# Patient Record
Sex: MALE | Race: ASIAN | URBAN - METROPOLITAN AREA
[De-identification: names, ages, dates, MRNs, and addresses within clinical notes are randomized per-mention and may not be internally consistent; named-entity substitution may affect disease eponyms.]

---

## 2023-05-12 ENCOUNTER — CLAIMS CREATED FROM THE CLAIM WINDOW (OUTPATIENT)
Dept: URBAN - METROPOLITAN AREA MEDICAL CENTER 10 | Facility: MEDICAL CENTER | Age: 63
End: 2023-05-12

## 2023-05-12 ENCOUNTER — CLAIMS CREATED FROM THE CLAIM WINDOW (OUTPATIENT)
Dept: URBAN - METROPOLITAN AREA MEDICAL CENTER 10 | Facility: MEDICAL CENTER | Age: 63
End: 2023-05-12
Payer: COMMERCIAL

## 2023-05-12 ENCOUNTER — OFFICE VISIT (OUTPATIENT)
Dept: URBAN - METROPOLITAN AREA CLINIC 78 | Facility: CLINIC | Age: 63
End: 2023-05-12

## 2023-05-12 DIAGNOSIS — B96.81 BACTERIAL INFECTION DUE TO H. PYLORI: ICD-10-CM

## 2023-05-12 DIAGNOSIS — R93.3 ABN FINDINGS-GI TRACT: ICD-10-CM

## 2023-05-12 DIAGNOSIS — R11.2 ACUTE NAUSEA WITH NONBILIOUS VOMITING: ICD-10-CM

## 2023-05-12 DIAGNOSIS — K21.01 GASTRO-ESOPHAGEAL REFLUX DISEASE WITH ESOPHAGITIS, WITH BLEEDING: ICD-10-CM

## 2023-05-12 DIAGNOSIS — K29.60 ADENOPAPILLOMATOSIS GASTRICA: ICD-10-CM

## 2023-05-12 PROCEDURE — 99223 1ST HOSP IP/OBS HIGH 75: CPT | Performed by: INTERNAL MEDICINE

## 2023-05-12 PROCEDURE — 43239 EGD BIOPSY SINGLE/MULTIPLE: CPT | Performed by: INTERNAL MEDICINE

## 2023-05-15 ENCOUNTER — OUT OF OFFICE VISIT (OUTPATIENT)
Dept: URBAN - METROPOLITAN AREA MEDICAL CENTER 10 | Facility: MEDICAL CENTER | Age: 63
End: 2023-05-15

## 2023-05-15 ENCOUNTER — CLAIMS CREATED FROM THE CLAIM WINDOW (OUTPATIENT)
Dept: URBAN - METROPOLITAN AREA MEDICAL CENTER 10 | Facility: MEDICAL CENTER | Age: 63
End: 2023-05-15
Payer: COMMERCIAL

## 2023-05-15 DIAGNOSIS — R93.3 ABN FINDINGS-GI TRACT: ICD-10-CM

## 2023-05-15 DIAGNOSIS — R11.2 ACUTE NAUSEA WITH NONBILIOUS VOMITING: ICD-10-CM

## 2023-05-15 DIAGNOSIS — K59.09 CHANGE IN BOWEL MOVEMENTS INTERMITTENT CONSTIPATION. URGENCY IN THE MORNING.: ICD-10-CM

## 2023-05-15 PROCEDURE — 99232 SBSQ HOSP IP/OBS MODERATE 35: CPT | Performed by: INTERNAL MEDICINE

## 2023-05-20 ENCOUNTER — OUT OF OFFICE VISIT (OUTPATIENT)
Dept: URBAN - METROPOLITAN AREA MEDICAL CENTER 10 | Facility: MEDICAL CENTER | Age: 63
End: 2023-05-20
Payer: COMMERCIAL

## 2023-05-20 DIAGNOSIS — K59.09 CHANGE IN BOWEL MOVEMENTS INTERMITTENT CONSTIPATION. URGENCY IN THE MORNING.: ICD-10-CM

## 2023-05-20 DIAGNOSIS — R11.2 ACUTE NAUSEA WITH NONBILIOUS VOMITING: ICD-10-CM

## 2023-05-20 DIAGNOSIS — K20.80 ESOPHAGITIS DISSECANS SUPERFICIALIS: ICD-10-CM

## 2023-05-20 PROCEDURE — 99232 SBSQ HOSP IP/OBS MODERATE 35: CPT | Performed by: INTERNAL MEDICINE

## 2023-05-21 ENCOUNTER — OUT OF OFFICE VISIT (OUTPATIENT)
Dept: URBAN - METROPOLITAN AREA MEDICAL CENTER 10 | Facility: MEDICAL CENTER | Age: 63
End: 2023-05-21
Payer: COMMERCIAL

## 2023-05-21 DIAGNOSIS — R11.2 ACUTE NAUSEA WITH NONBILIOUS VOMITING: ICD-10-CM

## 2023-05-21 DIAGNOSIS — B96.81 BACTERIAL INFECTION DUE TO H. PYLORI: ICD-10-CM

## 2023-05-21 DIAGNOSIS — K29.60 ADENOPAPILLOMATOSIS GASTRICA: ICD-10-CM

## 2023-05-21 DIAGNOSIS — K59.09 CHANGE IN BOWEL MOVEMENTS INTERMITTENT CONSTIPATION. URGENCY IN THE MORNING.: ICD-10-CM

## 2023-05-21 PROCEDURE — 99232 SBSQ HOSP IP/OBS MODERATE 35: CPT | Performed by: INTERNAL MEDICINE

## 2023-05-22 ENCOUNTER — OUT OF OFFICE VISIT (OUTPATIENT)
Dept: URBAN - METROPOLITAN AREA MEDICAL CENTER 10 | Facility: MEDICAL CENTER | Age: 63
End: 2023-05-22
Payer: COMMERCIAL

## 2023-05-22 DIAGNOSIS — R11.2 ACUTE NAUSEA WITH NONBILIOUS VOMITING: ICD-10-CM

## 2023-05-22 DIAGNOSIS — K59.09 CHANGE IN BOWEL MOVEMENTS INTERMITTENT CONSTIPATION. URGENCY IN THE MORNING.: ICD-10-CM

## 2023-05-22 DIAGNOSIS — R93.3 ABN FINDINGS-GI TRACT: ICD-10-CM

## 2023-05-22 DIAGNOSIS — R63.0 ALMOST NO APPETITE: ICD-10-CM

## 2023-05-22 PROCEDURE — 99232 SBSQ HOSP IP/OBS MODERATE 35: CPT | Performed by: INTERNAL MEDICINE

## 2023-05-22 PROCEDURE — 99231 SBSQ HOSP IP/OBS SF/LOW 25: CPT | Performed by: INTERNAL MEDICINE

## 2023-06-05 ENCOUNTER — CLAIMS CREATED FROM THE CLAIM WINDOW (OUTPATIENT)
Dept: URBAN - METROPOLITAN AREA MEDICAL CENTER 10 | Facility: MEDICAL CENTER | Age: 63
End: 2023-06-05
Payer: COMMERCIAL

## 2023-06-05 ENCOUNTER — CLAIMS CREATED FROM THE CLAIM WINDOW (OUTPATIENT)
Dept: URBAN - METROPOLITAN AREA MEDICAL CENTER 10 | Facility: MEDICAL CENTER | Age: 63
End: 2023-06-05

## 2023-06-05 DIAGNOSIS — K59.09 CHANGE IN BOWEL MOVEMENTS INTERMITTENT CONSTIPATION. URGENCY IN THE MORNING.: ICD-10-CM

## 2023-06-05 DIAGNOSIS — R63.0 ALMOST NO APPETITE: ICD-10-CM

## 2023-06-05 DIAGNOSIS — R11.0 AM NAUSEA: ICD-10-CM

## 2023-06-05 DIAGNOSIS — R93.2 ABN FIND-BILIARY TRACT: ICD-10-CM

## 2023-06-05 PROCEDURE — 99223 1ST HOSP IP/OBS HIGH 75: CPT | Performed by: INTERNAL MEDICINE

## 2023-06-05 PROCEDURE — 99232 SBSQ HOSP IP/OBS MODERATE 35: CPT | Performed by: INTERNAL MEDICINE

## 2023-06-05 PROCEDURE — G8427 DOCREV CUR MEDS BY ELIG CLIN: HCPCS | Performed by: INTERNAL MEDICINE

## 2023-06-06 ENCOUNTER — OFFICE VISIT (OUTPATIENT)
Dept: URBAN - METROPOLITAN AREA CLINIC 78 | Facility: CLINIC | Age: 63
End: 2023-06-06

## 2023-06-08 ENCOUNTER — OUT OF OFFICE VISIT (OUTPATIENT)
Dept: URBAN - METROPOLITAN AREA MEDICAL CENTER 10 | Facility: MEDICAL CENTER | Age: 63
End: 2023-06-08

## 2023-06-08 ENCOUNTER — CLAIMS CREATED FROM THE CLAIM WINDOW (OUTPATIENT)
Dept: URBAN - METROPOLITAN AREA MEDICAL CENTER 10 | Facility: MEDICAL CENTER | Age: 63
End: 2023-06-08
Payer: COMMERCIAL

## 2023-06-08 DIAGNOSIS — R11.2 ACUTE NAUSEA WITH NONBILIOUS VOMITING: ICD-10-CM

## 2023-06-08 DIAGNOSIS — K21.9 ACID REFLUX: ICD-10-CM

## 2023-06-08 DIAGNOSIS — R93.2 ABN FIND-BILIARY TRACT: ICD-10-CM

## 2023-06-08 DIAGNOSIS — K31.84 DECREASED MOTILITY OF STOMACH: ICD-10-CM

## 2023-06-08 PROCEDURE — 99232 SBSQ HOSP IP/OBS MODERATE 35: CPT | Performed by: INTERNAL MEDICINE

## 2023-06-18 ENCOUNTER — CLAIMS CREATED FROM THE CLAIM WINDOW (OUTPATIENT)
Dept: URBAN - METROPOLITAN AREA MEDICAL CENTER 10 | Facility: MEDICAL CENTER | Age: 63
End: 2023-06-18

## 2023-06-18 ENCOUNTER — CLAIMS CREATED FROM THE CLAIM WINDOW (OUTPATIENT)
Dept: URBAN - METROPOLITAN AREA MEDICAL CENTER 10 | Facility: MEDICAL CENTER | Age: 63
End: 2023-06-18
Payer: COMMERCIAL

## 2023-06-18 DIAGNOSIS — K20.80 ESOPHAGITIS DISSECANS SUPERFICIALIS: ICD-10-CM

## 2023-06-18 DIAGNOSIS — K25.9 ANTRAL ULCER: ICD-10-CM

## 2023-06-18 DIAGNOSIS — K92.0 BLOODY EMESIS: ICD-10-CM

## 2023-06-18 PROCEDURE — 43235 EGD DIAGNOSTIC BRUSH WASH: CPT | Performed by: STUDENT IN AN ORGANIZED HEALTH CARE EDUCATION/TRAINING PROGRAM

## 2023-06-19 ENCOUNTER — OUT OF OFFICE VISIT (OUTPATIENT)
Dept: URBAN - METROPOLITAN AREA MEDICAL CENTER 10 | Facility: MEDICAL CENTER | Age: 63
End: 2023-06-19
Payer: COMMERCIAL

## 2023-06-19 DIAGNOSIS — K21.00 ALKALINE REFLUX ESOPHAGITIS: ICD-10-CM

## 2023-06-19 DIAGNOSIS — K31.84 GASTROPARESIS: ICD-10-CM

## 2023-06-19 DIAGNOSIS — A04.8 BACTERIAL INFECTION DUE TO H. PYLORI: ICD-10-CM

## 2023-06-19 PROCEDURE — 99232 SBSQ HOSP IP/OBS MODERATE 35: CPT | Performed by: INTERNAL MEDICINE

## 2023-06-23 ENCOUNTER — OUT OF OFFICE VISIT (OUTPATIENT)
Dept: URBAN - METROPOLITAN AREA MEDICAL CENTER 10 | Facility: MEDICAL CENTER | Age: 63
End: 2023-06-23
Payer: COMMERCIAL

## 2023-06-23 DIAGNOSIS — K21.00 ALKALINE REFLUX ESOPHAGITIS: ICD-10-CM

## 2023-06-23 DIAGNOSIS — K31.84 GASTROPARESIS: ICD-10-CM

## 2023-06-23 DIAGNOSIS — A04.8 BACTERIAL INFECTION DUE TO H. PYLORI: ICD-10-CM

## 2023-06-23 PROCEDURE — 99232 SBSQ HOSP IP/OBS MODERATE 35: CPT | Performed by: INTERNAL MEDICINE

## 2023-06-26 ENCOUNTER — OUT OF OFFICE VISIT (OUTPATIENT)
Dept: URBAN - METROPOLITAN AREA MEDICAL CENTER 10 | Facility: MEDICAL CENTER | Age: 63
End: 2023-06-26
Payer: COMMERCIAL

## 2023-06-26 DIAGNOSIS — A04.8 BACTERIAL INFECTION DUE TO H. PYLORI: ICD-10-CM

## 2023-06-26 DIAGNOSIS — E46 CALORIC MALNUTRITION: ICD-10-CM

## 2023-06-26 DIAGNOSIS — K31.84 DECREASED MOTILITY OF STOMACH: ICD-10-CM

## 2023-06-26 DIAGNOSIS — R19.7 ACUTE DIARRHEA: ICD-10-CM

## 2023-06-26 PROCEDURE — 99232 SBSQ HOSP IP/OBS MODERATE 35: CPT | Performed by: INTERNAL MEDICINE

## 2023-07-06 ENCOUNTER — TELEPHONE ENCOUNTER (OUTPATIENT)
Dept: URBAN - METROPOLITAN AREA CLINIC 78 | Facility: CLINIC | Age: 63
End: 2023-07-06

## 2023-07-07 ENCOUNTER — TELEPHONE ENCOUNTER (OUTPATIENT)
Dept: URBAN - METROPOLITAN AREA CLINIC 78 | Facility: CLINIC | Age: 63
End: 2023-07-07

## 2023-07-07 PROBLEM — 13200003: Status: ACTIVE | Noted: 2023-07-07

## 2023-07-07 RX ORDER — CLARITHROMYCIN 500 MG/1
1 TABLET TABLET, FILM COATED ORAL
Qty: 28 TABLET | Refills: 0 | OUTPATIENT
Start: 2023-07-07 | End: 2023-07-21

## 2023-07-07 RX ORDER — AMOXICILLIN 500 MG/1
2 CAPSULES CAPSULE ORAL
Qty: 56 CAPSULE | Refills: 0 | OUTPATIENT
Start: 2023-07-07 | End: 2023-07-21

## 2023-07-07 RX ORDER — OMEPRAZOLE 20 MG/1
1 CAPSULE 30 MINUTES BEFORE MORNING MEAL AND 30 MINUTES BEFORE DINNER CAPSULE, DELAYED RELEASE ORAL TWICE DAILY
Qty: 28 | Refills: 0 | OUTPATIENT
Start: 2023-07-07

## 2023-07-07 RX ORDER — PANTOPRAZOLE SODIUM 20 MG/1
1 TABLET TABLET, DELAYED RELEASE ORAL ONCE A DAY
Qty: 30 | OUTPATIENT
Start: 2023-07-07

## 2023-07-10 ENCOUNTER — OUT OF OFFICE VISIT (OUTPATIENT)
Dept: URBAN - METROPOLITAN AREA MEDICAL CENTER 10 | Facility: MEDICAL CENTER | Age: 63
End: 2023-07-10
Payer: COMMERCIAL

## 2023-07-10 DIAGNOSIS — K31.84 DECREASED MOTILITY OF STOMACH: ICD-10-CM

## 2023-07-10 DIAGNOSIS — R19.7 ACUTE DIARRHEA: ICD-10-CM

## 2023-07-10 DIAGNOSIS — R14.0 ABDOMINAL BLOATING: ICD-10-CM

## 2023-07-10 DIAGNOSIS — R11.0 AM NAUSEA: ICD-10-CM

## 2023-07-10 PROCEDURE — G8427 DOCREV CUR MEDS BY ELIG CLIN: HCPCS | Performed by: INTERNAL MEDICINE

## 2023-07-10 PROCEDURE — 99223 1ST HOSP IP/OBS HIGH 75: CPT | Performed by: INTERNAL MEDICINE

## 2023-07-10 PROCEDURE — 99232 SBSQ HOSP IP/OBS MODERATE 35: CPT | Performed by: INTERNAL MEDICINE

## 2023-07-28 ENCOUNTER — TELEPHONE ENCOUNTER (OUTPATIENT)
Dept: URBAN - METROPOLITAN AREA CLINIC 78 | Facility: CLINIC | Age: 63
End: 2023-07-28

## 2023-08-08 ENCOUNTER — TELEPHONE ENCOUNTER (OUTPATIENT)
Dept: URBAN - METROPOLITAN AREA CLINIC 78 | Facility: CLINIC | Age: 63
End: 2023-08-08

## 2023-08-08 ENCOUNTER — OFFICE VISIT (OUTPATIENT)
Dept: URBAN - METROPOLITAN AREA MEDICAL CENTER 10 | Facility: MEDICAL CENTER | Age: 63
End: 2023-08-08
Payer: COMMERCIAL

## 2023-08-08 DIAGNOSIS — K29.60 ADENOPAPILLOMATOSIS GASTRICA: ICD-10-CM

## 2023-08-08 DIAGNOSIS — K20.80 ESOPHAGITIS DISSECANS SUPERFICIALIS: ICD-10-CM

## 2023-08-08 DIAGNOSIS — K25.9 ANTRAL ULCER: ICD-10-CM

## 2023-08-08 PROCEDURE — 43239 EGD BIOPSY SINGLE/MULTIPLE: CPT | Performed by: INTERNAL MEDICINE

## 2023-08-08 RX ORDER — PANTOPRAZOLE SODIUM 20 MG/1
1 TABLET TABLET, DELAYED RELEASE ORAL ONCE A DAY
Qty: 30 | Status: ACTIVE | COMMUNITY
Start: 2023-07-07

## 2023-08-08 RX ORDER — SUCRALFATE 1 G/1
1 TABLET ON AN EMPTY STOMACH TABLET ORAL
Qty: 120 | Refills: 3 | OUTPATIENT
Start: 2023-08-08 | End: 2023-09-07

## 2023-08-08 RX ORDER — OMEPRAZOLE 20 MG/1
1 CAPSULE 30 MINUTES BEFORE MORNING MEAL AND 30 MINUTES BEFORE DINNER CAPSULE, DELAYED RELEASE ORAL TWICE DAILY
Qty: 28 | Refills: 0 | Status: ACTIVE | COMMUNITY
Start: 2023-07-07

## 2023-08-11 ENCOUNTER — OFFICE VISIT (OUTPATIENT)
Dept: URBAN - METROPOLITAN AREA SURGERY CENTER 15 | Facility: SURGERY CENTER | Age: 63
End: 2023-08-11

## 2023-08-28 ENCOUNTER — OFFICE VISIT (OUTPATIENT)
Dept: URBAN - METROPOLITAN AREA CLINIC 78 | Facility: CLINIC | Age: 63
End: 2023-08-28
Payer: COMMERCIAL

## 2023-08-28 VITALS
BODY MASS INDEX: 15.85 KG/M2 | DIASTOLIC BLOOD PRESSURE: 69 MMHG | TEMPERATURE: 98.2 F | RESPIRATION RATE: 15 BRPM | HEIGHT: 69 IN | SYSTOLIC BLOOD PRESSURE: 93 MMHG | HEART RATE: 108 BPM | WEIGHT: 107 LBS

## 2023-08-28 DIAGNOSIS — R11.2 NAUSEA & VOMITING: ICD-10-CM

## 2023-08-28 DIAGNOSIS — K20.90 ESOPHAGITIS: ICD-10-CM

## 2023-08-28 DIAGNOSIS — K31.84 GASTROPARESIS: ICD-10-CM

## 2023-08-28 DIAGNOSIS — A04.8 H. PYLORI INFECTION: ICD-10-CM

## 2023-08-28 PROCEDURE — 99215 OFFICE O/P EST HI 40 MIN: CPT | Performed by: INTERNAL MEDICINE

## 2023-08-28 RX ORDER — PANTOPRAZOLE SODIUM 20 MG/1
1 TABLET TABLET, DELAYED RELEASE ORAL ONCE A DAY
Qty: 30 | Status: ACTIVE | COMMUNITY
Start: 2023-07-07

## 2023-08-28 RX ORDER — SUCRALFATE 1 G/1
1 TABLET ON AN EMPTY STOMACH TABLET ORAL
Qty: 120 | Refills: 3
Start: 2023-08-08 | End: 2023-12-26

## 2023-08-28 RX ORDER — SUCRALFATE 1 G/1
1 TABLET ON AN EMPTY STOMACH TABLET ORAL
Qty: 120 | Refills: 3 | Status: ON HOLD | COMMUNITY
Start: 2023-08-08 | End: 2023-09-07

## 2023-08-28 RX ORDER — OMEPRAZOLE 20 MG/1
1 CAPSULE 30 MINUTES BEFORE MORNING MEAL AND 30 MINUTES BEFORE DINNER CAPSULE, DELAYED RELEASE ORAL TWICE DAILY
Qty: 28 | Refills: 0 | Status: ACTIVE | COMMUNITY
Start: 2023-07-07

## 2023-08-28 RX ORDER — ESOMEPRAZOLE MAGNESIUM 40 MG/1
1 CAPSULE 30 MINUTES BEFORE BREAKFAST AND 30 MINUTES BEFORE DINNER CAPSULE, DELAYED RELEASE ORAL TWICE A DAY
Qty: 60 | Refills: 3 | OUTPATIENT
Start: 2023-08-28

## 2023-08-28 RX ORDER — METOCLOPRAMIDE 10 MG/1
1 TABLET BEFORE MEALS TABLET ORAL
Qty: 300 | Refills: 1

## 2023-08-28 RX ORDER — PANTOPRAZOLE SODIUM 40 MG/1
1 TABLET- 30 MINS BEFORE AND 30 MINS BEFORE DINNER TABLET, DELAYED RELEASE ORAL TWICE A DAY
Qty: 60 | Refills: 2 | OUTPATIENT
Start: 2023-08-28

## 2023-08-28 NOTE — PHYSICAL EXAM GASTROINTESTINAL
Abdomen , soft, nontender, nondistended , no guarding or rigidity , no masses palpable , normal bowel sounds , G-J tube in place without erythema and only minimal discharge Liver and Spleen , no hepatomegaly present , no hepatosplenomegaly , liver nontender , spleen not palpable  , Abdomen , soft, nontender, nondistended , no guarding or rigidity , no masses palpable , normal bowel sounds , Liver and Spleen , no hepatomegaly present , no hepatosplenomegaly , liver nontender , spleen not palpable

## 2023-08-28 NOTE — HPI-TODAY'S VISIT:
I have met the patient on repeated occasions at [facility] for recurrent admissions due to underlying diabetic gastroparesis and intractable nausea and vomiting complicated by severe malnutrition. The patient is a 62-year-old   gentleman well-known to our service who was diagnosed with diabetic gastroparesis status post GJ tube placement on 614 (G-tube for venting, G-tube for nutrition), also noted to have H. pylori infection (status posttreatment, eradication has since been achieved) and peptic ulcer disease.  He has had intermittent episodes of diarrhea alternating with constipation.  He has macrocytic anemia and poorly controlled type 2 diabetes mellitus with glucose levels over 200-300 at times. He has required admissions for fevers and chills felt to be secondary to aspiration pneumonia, hypotension, metabolic acidosis and acute kidney injury.  He is being worked up with [facility] (Dr. Shields) for GPOEM, but the family tells me that they went to see some other doctor that also treats motility in the meantime.As a matter of fact, he had another EGD last week as they were seeking a second opinion.    He has been compliant with using PPI BID and Carafate QID He has also been using Imodium +/- probiotic to manage the diarrhea.  He has been vomiting, mainly bilious fluid.  They took him to the ED yesterday again. CT scan was apparently suggestive but not diagnostic of another pneumonia.   He denies much in the way of heartburn.   They are concerned about him having been on Reglan for the past 3 months.   Today I reviewed the results of his most recent EGD/path with us. H pylori was negative.   He continues to spit up constantly.   Family is worried about Protonix causing penumonia.  Summary of prior work-up: - CT A/P on 8/27/23: 1.  No bowel obstruction. Gastrojejunostomy tube in place. While no radiopaque line is seen along the catheter from the gastric balloon to the proximal duodenum, the catheter appears to be contiguous. If there is concern for catheter disruption, consider further evaluation with KUB following contrast injection through the tube. 2.  Mild groundglass and consolidative opacities in the right middle lobe and right lung base, concerning for pneumonia. - EGD by me on 8/8/23:  Normal upper third of esophagus and middle third of esophagus. LA Grade C erosive esophagitis with no bleeding. [place]-colored mucosa suspicious for short-segment King's esophagus. No gross lesions in the cardia and in the gastric fundus.  Nodular mucosa in the gastric body. Congestive gastropathy. A single mucosal papule (nodule) found in the stomach. A gastric tube with tip in the jejunum was found in the stomach. Normal examined duodenum. Biopsies were neg for BE, H pylori or GIM. + chronic inflammation in the esophagus. - MRI 6/8/23: 1. Unclear if artifact or true pancreatic parenchymal edema with trace surrounding ascites such as may be associated with interstitial edematous acute pancreatitis. In clinical setting of no reported abdominal pain findings favored to be artifactual but consider repeat lipase to clarify. 2. Mild central biliary radicle and CBD duct dilatation to 7 mm with demonstrated appropriate tapering of CBD towards the papilla. No demonstrated choledocholithiasis or gallbladder calculus. Gallbladder within expected limits. . Patchy marrow enhancement with heterogeneous signal predominating about the bony pelvis that is strictly nonspecific. 4. Prominent cachexia. Trace ascites versus artifact. No organized drainable collection.  - EGD on 6/18/23 by Dr. Gill: Jason class III gastric ulcer ~10cm in largest diameter and LA Grade C Esophagitis - GES on 6/12/23 unsuccessful because patient could not tolerate PO - CT with contrast in May 2023 showing patchy opacity within the dependent right lobe possible atelectasis vs PNA-mild wall thickening of distal esophagus and multiple prominent fluid filled small bowel loops without obstruction distended stomach with A/F level - GES on May 2023:  Abnormal study. Activity does not appear to empty from the stomach during the course of the study. 100% of activity remaining in the stomach at 90 minutes. -  EGD by me  on 5/13/23: Grade D esophagitis.  A large amount of food (residue) in the stomach. Erythematous mucosa in the antrum. Normal pylorus. Congested duodenal mucosa. Biopsied -CT showing borderline dilated CBD and mild prominence of  intrahepatic ducts however LFTS normal

## 2023-08-28 NOTE — PHYSICAL EXAM CONSTITUTIONAL:
well developed,cachectic , in no acute distress , ambulating without difficulty , normal communication ability  ,

## 2023-09-14 ENCOUNTER — OUT OF OFFICE VISIT (OUTPATIENT)
Dept: URBAN - METROPOLITAN AREA MEDICAL CENTER 10 | Facility: MEDICAL CENTER | Age: 63
End: 2023-09-14
Payer: COMMERCIAL

## 2023-09-14 DIAGNOSIS — R11.2 ACUTE NAUSEA WITH NONBILIOUS VOMITING: ICD-10-CM

## 2023-09-14 DIAGNOSIS — K59.09 CHANGE IN BOWEL MOVEMENTS INTERMITTENT CONSTIPATION. URGENCY IN THE MORNING.: ICD-10-CM

## 2023-09-14 DIAGNOSIS — R63.4 ABNORMAL INTENTIONAL WEIGHT LOSS: ICD-10-CM

## 2023-09-14 PROCEDURE — 99223 1ST HOSP IP/OBS HIGH 75: CPT | Performed by: INTERNAL MEDICINE

## 2023-09-14 PROCEDURE — 99233 SBSQ HOSP IP/OBS HIGH 50: CPT | Performed by: INTERNAL MEDICINE

## 2023-09-16 ENCOUNTER — OUT OF OFFICE VISIT (OUTPATIENT)
Dept: URBAN - METROPOLITAN AREA MEDICAL CENTER 10 | Facility: MEDICAL CENTER | Age: 63
End: 2023-09-16
Payer: COMMERCIAL

## 2023-09-16 DIAGNOSIS — K62.89 ACUTE PROCTITIS: ICD-10-CM

## 2023-09-16 DIAGNOSIS — K92.2 ACUTE GASTROINTESTINAL BLEEDING: ICD-10-CM

## 2023-09-16 PROCEDURE — 45334 SIGMOIDOSCOPY FOR BLEEDING: CPT | Performed by: INTERNAL MEDICINE

## 2023-09-17 ENCOUNTER — OUT OF OFFICE VISIT (OUTPATIENT)
Dept: URBAN - METROPOLITAN AREA MEDICAL CENTER 10 | Facility: MEDICAL CENTER | Age: 63
End: 2023-09-17
Payer: COMMERCIAL

## 2023-09-17 DIAGNOSIS — K62.89 ACUTE PROCTITIS: ICD-10-CM

## 2023-09-17 DIAGNOSIS — K62.5 ANAL BLEEDING: ICD-10-CM

## 2023-09-17 DIAGNOSIS — K59.09 CHANGE IN BOWEL MOVEMENTS INTERMITTENT CONSTIPATION. URGENCY IN THE MORNING.: ICD-10-CM

## 2023-09-17 PROCEDURE — 99233 SBSQ HOSP IP/OBS HIGH 50: CPT | Performed by: INTERNAL MEDICINE

## 2023-09-18 ENCOUNTER — OUT OF OFFICE VISIT (OUTPATIENT)
Dept: URBAN - METROPOLITAN AREA MEDICAL CENTER 10 | Facility: MEDICAL CENTER | Age: 63
End: 2023-09-18

## 2023-09-18 ENCOUNTER — CLAIMS CREATED FROM THE CLAIM WINDOW (OUTPATIENT)
Dept: URBAN - METROPOLITAN AREA MEDICAL CENTER 10 | Facility: MEDICAL CENTER | Age: 63
End: 2023-09-18
Payer: COMMERCIAL

## 2023-09-18 DIAGNOSIS — K62.6 ANAL ULCER: ICD-10-CM

## 2023-09-18 DIAGNOSIS — D64.89 ANEMIA DUE TO OTHER CAUSE: ICD-10-CM

## 2023-09-18 DIAGNOSIS — K62.5 ANAL BLEEDING: ICD-10-CM

## 2023-09-18 DIAGNOSIS — K59.09 CHANGE IN BOWEL MOVEMENTS INTERMITTENT CONSTIPATION. URGENCY IN THE MORNING.: ICD-10-CM

## 2023-09-18 PROCEDURE — 99232 SBSQ HOSP IP/OBS MODERATE 35: CPT | Performed by: INTERNAL MEDICINE

## 2023-09-19 ENCOUNTER — CLAIMS CREATED FROM THE CLAIM WINDOW (OUTPATIENT)
Dept: URBAN - METROPOLITAN AREA MEDICAL CENTER 10 | Facility: MEDICAL CENTER | Age: 63
End: 2023-09-19
Payer: COMMERCIAL

## 2023-09-19 ENCOUNTER — OUT OF OFFICE VISIT (OUTPATIENT)
Dept: URBAN - METROPOLITAN AREA MEDICAL CENTER 10 | Facility: MEDICAL CENTER | Age: 63
End: 2023-09-19

## 2023-09-19 DIAGNOSIS — D62 ABLA (ACUTE BLOOD LOSS ANEMIA): ICD-10-CM

## 2023-09-19 DIAGNOSIS — K62.5 ANAL BLEEDING: ICD-10-CM

## 2023-09-19 DIAGNOSIS — K59.09 CHANGE IN BOWEL MOVEMENTS INTERMITTENT CONSTIPATION. URGENCY IN THE MORNING.: ICD-10-CM

## 2023-09-19 PROCEDURE — 99232 SBSQ HOSP IP/OBS MODERATE 35: CPT | Performed by: INTERNAL MEDICINE

## 2023-09-24 ENCOUNTER — OUT OF OFFICE VISIT (OUTPATIENT)
Dept: URBAN - METROPOLITAN AREA MEDICAL CENTER 10 | Facility: MEDICAL CENTER | Age: 63
End: 2023-09-24

## 2023-09-24 ENCOUNTER — CLAIMS CREATED FROM THE CLAIM WINDOW (OUTPATIENT)
Dept: URBAN - METROPOLITAN AREA MEDICAL CENTER 10 | Facility: MEDICAL CENTER | Age: 63
End: 2023-09-24
Payer: COMMERCIAL

## 2023-09-24 DIAGNOSIS — K62.6 ANAL ULCER: ICD-10-CM

## 2023-09-24 DIAGNOSIS — K63.3 APHTHOUS ULCER OF COLON: ICD-10-CM

## 2023-09-24 PROCEDURE — 45331 SIGMOIDOSCOPY AND BIOPSY: CPT | Performed by: INTERNAL MEDICINE

## 2023-09-24 PROCEDURE — 45334 SIGMOIDOSCOPY FOR BLEEDING: CPT | Performed by: INTERNAL MEDICINE

## 2023-09-25 ENCOUNTER — OUT OF OFFICE VISIT (OUTPATIENT)
Dept: URBAN - METROPOLITAN AREA MEDICAL CENTER 10 | Facility: MEDICAL CENTER | Age: 63
End: 2023-09-25
Payer: COMMERCIAL

## 2023-09-25 DIAGNOSIS — K62.6 ANAL ULCER: ICD-10-CM

## 2023-09-25 DIAGNOSIS — D62 ABLA (ACUTE BLOOD LOSS ANEMIA): ICD-10-CM

## 2023-09-25 DIAGNOSIS — K62.5 ANAL BLEEDING: ICD-10-CM

## 2023-09-25 DIAGNOSIS — K59.09 CHANGE IN BOWEL MOVEMENTS INTERMITTENT CONSTIPATION. URGENCY IN THE MORNING.: ICD-10-CM

## 2023-09-25 PROCEDURE — 99231 SBSQ HOSP IP/OBS SF/LOW 25: CPT | Performed by: INTERNAL MEDICINE

## 2023-09-26 ENCOUNTER — P2P PATIENT RECORD (OUTPATIENT)
Age: 63
End: 2023-09-26

## 2023-10-30 ENCOUNTER — OFFICE VISIT (OUTPATIENT)
Dept: URBAN - METROPOLITAN AREA CLINIC 78 | Facility: CLINIC | Age: 63
End: 2023-10-30
Payer: COMMERCIAL

## 2023-10-30 VITALS
DIASTOLIC BLOOD PRESSURE: 76 MMHG | HEART RATE: 83 BPM | SYSTOLIC BLOOD PRESSURE: 120 MMHG | HEIGHT: 69 IN | RESPIRATION RATE: 16 BRPM | BODY MASS INDEX: 18.33 KG/M2 | WEIGHT: 123.8 LBS | TEMPERATURE: 98.2 F

## 2023-10-30 DIAGNOSIS — E46 MALNUTRITION: ICD-10-CM

## 2023-10-30 DIAGNOSIS — K31.84 GASTROPARESIS: ICD-10-CM

## 2023-10-30 DIAGNOSIS — A04.8 H. PYLORI INFECTION: ICD-10-CM

## 2023-10-30 DIAGNOSIS — K20.90 ESOPHAGITIS: ICD-10-CM

## 2023-10-30 PROCEDURE — 99214 OFFICE O/P EST MOD 30 MIN: CPT | Performed by: INTERNAL MEDICINE

## 2023-10-30 RX ORDER — METOCLOPRAMIDE 10 MG/1
1 TABLET BEFORE MEALS TABLET ORAL
Qty: 300 | Refills: 1 | Status: ACTIVE | COMMUNITY

## 2023-10-30 RX ORDER — PANTOPRAZOLE SODIUM 40 MG/1
1 TABLET- 30 MINS BEFORE AND 30 MINS BEFORE DINNER TABLET, DELAYED RELEASE ORAL TWICE A DAY
Qty: 60 | Refills: 2 | OUTPATIENT

## 2023-10-30 RX ORDER — OMEPRAZOLE 20 MG/1
1 CAPSULE 30 MINUTES BEFORE MORNING MEAL AND 30 MINUTES BEFORE DINNER CAPSULE, DELAYED RELEASE ORAL TWICE DAILY
Qty: 28 | Refills: 0 | Status: ACTIVE | COMMUNITY
Start: 2023-07-07

## 2023-10-30 RX ORDER — SUCRALFATE 1 G/1
1 TABLET ON AN EMPTY STOMACH TABLET ORAL
Qty: 120 | Refills: 3 | Status: ACTIVE | COMMUNITY
Start: 2023-08-08 | End: 2023-12-26

## 2023-10-30 RX ORDER — SUCRALFATE 1 G/1
1 TABLET ON AN EMPTY STOMACH TABLET ORAL
Qty: 60 TABLET | Refills: 3
Start: 2023-08-08 | End: 2024-02-27

## 2023-10-30 RX ORDER — ESOMEPRAZOLE MAGNESIUM 40 MG/1
1 CAPSULE 30 MINUTES BEFORE BREAKFAST AND 30 MINUTES BEFORE DINNER CAPSULE, DELAYED RELEASE ORAL TWICE A DAY
Qty: 60 | Refills: 3 | Status: ACTIVE | COMMUNITY
Start: 2023-08-28

## 2023-10-30 RX ORDER — PANTOPRAZOLE SODIUM 40 MG/1
1 TABLET- 30 MINS BEFORE AND 30 MINS BEFORE DINNER TABLET, DELAYED RELEASE ORAL TWICE A DAY
Qty: 60 | Refills: 2 | Status: ACTIVE | COMMUNITY
Start: 2023-08-28

## 2023-10-30 RX ORDER — PANTOPRAZOLE SODIUM 20 MG/1
1 TABLET TABLET, DELAYED RELEASE ORAL ONCE A DAY
Qty: 30 | Status: ACTIVE | COMMUNITY
Start: 2023-07-07

## 2023-10-30 NOTE — PHYSICAL EXAM GASTROINTESTINAL
Abdomen , soft, nontender, nondistended , no guarding or rigidity , no masses palpable , normal bowel sounds , Liver and Spleen , no hepatomegaly present , no hepatosplenomegaly , liver nontender , spleen not palpable G tube in place.  Wounds from recent surgical procedure appear to be healing well.

## 2023-10-30 NOTE — HPI-TODAY'S VISIT:
I have met the patient on repeated occasions at [facility] for recurrent admissions due to underlying diabetic gastroparesis and intractable nausea and vomiting complicated by severe malnutrition. The patient is a 62-year-old   gentleman well-known to our service who was diagnosed with diabetic gastroparesis status post GJ tube placement on 614 (G-tube for venting, G-tube for nutrition), also noted to have H. pylori infection (status posttreatment, eradication has since been achieved) and peptic ulcer disease.  He has had intermittent episodes of diarrhea alternating with constipation.  He has macrocytic anemia and poorly controlled type 2 diabetes mellitus with glucose levels over 200-300 at times. He has required admissions for fevers and chills felt to be secondary to aspiration pneumonia, hypotension, metabolic acidosis and acute kidney injury.  He has been compliant with using PPI BID and Carafate QID He has also been using Imodium +/- probiotic to manage the diarrhea.  He was being worked up at [facility] (Dr. Shields) for GPOEM, but they felt the wait was too long and decided to go to [facility] instead. He underwent both HH repair and pyloroplasty by  Dr. Larson on 10/20/23. He has been doing much better. He replaced the GJ tube. He only has a G tube in place now.  He was just on a liquid diet last week. He was able to drink soup and Boost. He still required some nutritional supplementation. Yesterday he was able to start soft mechanical diet.  He overall feels 50% better.  He has been able to gain about 5 lbs.  He has noticed increased energy levels.   He continues to be compliant with the Omeprazole and Carafate. He needs a new prescription for these.   He continues salivating excessively and does continue to spit up constantly. He has noted some dysphagia.   Eradication of H pylori was confirmed.   Family is worried about Protonix causing penumonia.  Of note, during his most recent hospitalization, I was asked to see him for recurrent hematochezia, as wee as stool impaction/constipation, despite 3 times daily MiraLAX dosing via J-tube. Flex sig was done as described above. Patient reports no further episodes of hematochezia.  Summary of prior work-up: - Flex Sig 9/24/23: single 7 mm ulcer in the Rectosigmoid colon with friable mucosa and bleeding---Rx'd with Epi and bipolar probe-also seen rectal diverticulosis and additional 3 mm ulcer in distal rectum with no bleeding. Superficial fragments of rectal mucosa with hyperplastic changes and scant fragments of fibrinopurulent debris's, consistent with ulcer bed debris's.Negative for dysplasia or malignancy in the submitted tissue fragment (multiple deeper levels were examined.  -  CT of abdomen and pelvis Sept 2023:Small focus of extraluminal air was noticed on CT scan. Filled outpouching along the right lateral aspect of the lower rectum measuring 1.3 x 0.6 x 1.8 cm. No definite fistulous communication with adjacent organs identified - CT A/P on 8/27/23: 1.  No bowel obstruction. Gastrojejunostomy tube in place. While no radiopaque line is seen along the catheter from the gastric balloon to the proximal duodenum, the catheter appears to be contiguous. If there is concern for catheter disruption, consider further evaluation with KUB following contrast injection through the tube. 2.  Mild groundglass and consolidative opacities in the right middle lobe and right lung base, concerning for pneumonia. - EGD by me on 8/8/23:  Normal upper third of esophagus and middle third of esophagus. LA Grade C erosive esophagitis with no bleeding. [place]-colored mucosa suspicious for short-segment King's esophagus. No gross lesions in the cardia and in the gastric fundus.  Nodular mucosa in the gastric body. Congestive gastropathy. A single mucosal papule (nodule) found in the stomach. A gastric tube with tip in the jejunum was found in the stomach. Normal examined duodenum. Biopsies were neg for BE, H pylori or GIM. + chronic inflammation in the esophagus. - MRI 6/8/23: 1. Unclear if artifact or true pancreatic parenchymal edema with trace surrounding ascites such as may be associated with interstitial edematous acute pancreatitis. In clinical setting of no reported abdominal pain findings favored to be artifactual but consider repeat lipase to clarify. 2. Mild central biliary radicle and CBD duct dilatation to 7 mm with demonstrated appropriate tapering of CBD towards the papilla. No demonstrated choledocholithiasis or gallbladder calculus. Gallbladder within expected limits. . Patchy marrow enhancement with heterogeneous signal predominating about the bony pelvis that is strictly nonspecific. 4. Prominent cachexia. Trace ascites versus artifact. No organized drainable collection.  - EGD on 6/18/23 by Dr. Gill: Jason class III gastric ulcer ~10cm in largest diameter and LA Grade C Esophagitis - GES on 6/12/23 unsuccessful because patient could not tolerate PO - CT with contrast in May 2023 showing patchy opacity within the dependent right lobe possible atelectasis vs PNA-mild wall thickening of distal esophagus and multiple prominent fluid filled small bowel loops without obstruction distended stomach with A/F level - GES on May 2023:  Abnormal study. Activity does not appear to empty from the stomach during the course of the study. 100% of activity remaining in the stomach at 90 minutes. -  EGD by me  on 5/13/23: Grade D esophagitis.  A large amount of food (residue) in the stomach. Erythematous mucosa in the antrum. Normal pylorus. Congested duodenal mucosa. Biopsied -CT showing borderline dilated CBD and mild prominence of  intrahepatic ducts however LFTS normal

## 2023-12-12 ENCOUNTER — OFFICE VISIT (OUTPATIENT)
Dept: URBAN - METROPOLITAN AREA CLINIC 78 | Facility: CLINIC | Age: 63
End: 2023-12-12

## 2024-02-12 ENCOUNTER — OV EP (OUTPATIENT)
Dept: URBAN - METROPOLITAN AREA CLINIC 78 | Facility: CLINIC | Age: 64
End: 2024-02-12

## 2024-04-29 ENCOUNTER — OV EP (OUTPATIENT)
Dept: URBAN - METROPOLITAN AREA CLINIC 78 | Facility: CLINIC | Age: 64
End: 2024-04-29

## 2024-07-08 ENCOUNTER — OFFICE VISIT (OUTPATIENT)
Dept: URBAN - METROPOLITAN AREA CLINIC 78 | Facility: CLINIC | Age: 64
End: 2024-07-08

## 2024-09-09 ENCOUNTER — OFFICE VISIT (OUTPATIENT)
Dept: URBAN - METROPOLITAN AREA CLINIC 78 | Facility: CLINIC | Age: 64
End: 2024-09-09